# Patient Record
Sex: FEMALE | Race: WHITE | NOT HISPANIC OR LATINO | URBAN - NONMETROPOLITAN AREA
[De-identification: names, ages, dates, MRNs, and addresses within clinical notes are randomized per-mention and may not be internally consistent; named-entity substitution may affect disease eponyms.]

---

## 2017-10-11 PROBLEM — H52.03: Noted: 2017-10-11

## 2017-10-11 PROBLEM — H52.4: Noted: 2017-10-11

## 2017-10-11 PROBLEM — H52.223: Noted: 2017-10-11

## 2019-10-17 ENCOUNTER — IMPORTED ENCOUNTER (OUTPATIENT)
Dept: URBAN - NONMETROPOLITAN AREA CLINIC 1 | Facility: CLINIC | Age: 54
End: 2019-10-17

## 2019-10-17 PROCEDURE — 92014 COMPRE OPH EXAM EST PT 1/>: CPT

## 2019-10-17 PROCEDURE — 92015 DETERMINE REFRACTIVE STATE: CPT

## 2019-10-17 NOTE — PATIENT DISCUSSION
Hyperopia/Astigmatism/Presbyopia OUDiscussed refractive status in detail with patient. New glasses Rx given today. Continue to monitor.

## 2020-11-04 NOTE — PATIENT DISCUSSION
GLAUCOMA SUSPECT, OU :NO KNOWN FAMILY HISTORY:  INTRAOCULAR PRESSURE AND OCT  IS WITHIN ACCEPTABLE LIMITS. NO DROP THERAPY NEEDED AT THIS TIME.

## 2022-04-09 ASSESSMENT — VISUAL ACUITY
OD_SC: 20/20
OS_SC: 20/20

## 2022-04-09 ASSESSMENT — TONOMETRY
OD_IOP_MMHG: 16
OS_IOP_MMHG: 16